# Patient Record
Sex: MALE | Race: WHITE | URBAN - NONMETROPOLITAN AREA
[De-identification: names, ages, dates, MRNs, and addresses within clinical notes are randomized per-mention and may not be internally consistent; named-entity substitution may affect disease eponyms.]

---

## 2021-05-10 ENCOUNTER — OFFICE VISIT (OUTPATIENT)
Dept: URBAN - NONMETROPOLITAN AREA CLINIC 13 | Facility: CLINIC | Age: 71
End: 2021-05-10

## 2021-05-24 ENCOUNTER — OFFICE VISIT (OUTPATIENT)
Dept: URBAN - NONMETROPOLITAN AREA CLINIC 13 | Facility: CLINIC | Age: 71
End: 2021-05-24
Payer: MEDICARE

## 2021-05-24 ENCOUNTER — WEB ENCOUNTER (OUTPATIENT)
Dept: URBAN - NONMETROPOLITAN AREA CLINIC 13 | Facility: CLINIC | Age: 71
End: 2021-05-24

## 2021-05-24 DIAGNOSIS — B37.0 THRUSH: ICD-10-CM

## 2021-05-24 DIAGNOSIS — R12 HEARTBURN: ICD-10-CM

## 2021-05-24 DIAGNOSIS — Z98.890 HISTORY OF NISSEN FUNDOPLICATION: ICD-10-CM

## 2021-05-24 DIAGNOSIS — J02.9 SORE THROAT: ICD-10-CM

## 2021-05-24 PROCEDURE — 99204 OFFICE O/P NEW MOD 45 MIN: CPT | Performed by: INTERNAL MEDICINE

## 2021-05-24 NOTE — HPI-TODAY'S VISIT:
Mr. Reginald Buenrostro is a pleasant 71 y/o M referred to GI clinic by Dr. Casey Cunha for heartburn and indigestion.  He visited ENT clinic with a sore throat and was found to have thrush.  He was treated with 2 weeks of diflucan and nystatin and developed bad heartburn when he was in bed.  He took omeprazole x 2 weeks and this resolved.  He is currently feeling well now.  No dysphagia or heartburn.  He saw a previous GI in [place] and gets colonoscopies every 5 years.  He is due next year (last 2017).  He does have a history of Nissen / hiatal hernia repair in 2005.

## 2021-05-25 PROBLEM — 123950001: Status: ACTIVE | Noted: 2021-05-25

## 2021-06-22 ENCOUNTER — CLAIMS CREATED FROM THE CLAIM WINDOW (OUTPATIENT)
Dept: URBAN - METROPOLITAN AREA CLINIC 4 | Facility: CLINIC | Age: 71
End: 2021-06-22
Payer: MEDICARE

## 2021-06-22 ENCOUNTER — OFFICE VISIT (OUTPATIENT)
Dept: URBAN - NONMETROPOLITAN AREA SURGERY CENTER 1 | Facility: SURGERY CENTER | Age: 71
End: 2021-06-22
Payer: MEDICARE

## 2021-06-22 DIAGNOSIS — K21.9 ACID REFLUX: ICD-10-CM

## 2021-06-22 DIAGNOSIS — K21.9 GASTRO-ESOPHAGEAL REFLUX DISEASE WITHOUT ESOPHAGITIS: ICD-10-CM

## 2021-06-22 PROCEDURE — 88305 TISSUE EXAM BY PATHOLOGIST: CPT | Performed by: PATHOLOGY

## 2021-06-22 PROCEDURE — G8907 PT DOC NO EVENTS ON DISCHARG: HCPCS | Performed by: INTERNAL MEDICINE

## 2021-06-22 PROCEDURE — 43239 EGD BIOPSY SINGLE/MULTIPLE: CPT | Performed by: INTERNAL MEDICINE

## 2021-07-26 ENCOUNTER — OFFICE VISIT (OUTPATIENT)
Dept: URBAN - NONMETROPOLITAN AREA CLINIC 13 | Facility: CLINIC | Age: 71
End: 2021-07-26
Payer: MEDICARE

## 2021-07-26 ENCOUNTER — DASHBOARD ENCOUNTERS (OUTPATIENT)
Age: 71
End: 2021-07-26

## 2021-07-26 DIAGNOSIS — R19.5 LOOSE STOOLS: ICD-10-CM

## 2021-07-26 DIAGNOSIS — Z80.0 FAMILY HISTORY OF COLON CANCER: ICD-10-CM

## 2021-07-26 DIAGNOSIS — R12 HEARTBURN: ICD-10-CM

## 2021-07-26 PROCEDURE — 99213 OFFICE O/P EST LOW 20 MIN: CPT | Performed by: NURSE PRACTITIONER

## 2021-07-26 RX ORDER — NYSTATIN 100000 [USP'U]/ML
SWISH 4ML IN MOUTH AND SPIT 3 TIMES DAILY FOR 7 DAYS FOR MOUTH THROAT SUSPENSION ORAL
Qty: 84 | Refills: 0 | Status: ACTIVE | COMMUNITY

## 2021-07-26 RX ORDER — METRONIDAZOLE 500 MG/1
1 TABLET TABLET, FILM COATED ORAL THREE TIMES A DAY
Qty: 42 TABLET | Refills: 0 | OUTPATIENT
Start: 2021-07-26 | End: 2021-08-09

## 2021-07-26 RX ORDER — FLUTICASONE PROPIONATE 50 UG/1
SPRAY, METERED NASAL
Qty: 16 | Status: ACTIVE | COMMUNITY

## 2021-07-26 RX ORDER — TESTOSTERONE 30 MG/1.5ML
APPLY 1 PUMP TOPICALLY EVERY MORNING SOLUTION TOPICAL
Qty: 90 | Refills: 0 | Status: ACTIVE | COMMUNITY

## 2021-07-26 RX ORDER — OMEPRAZOLE 20 MG/1
TAKE 1 CAPSULE BY MOUTH TWICE DAILY CAPSULE, DELAYED RELEASE ORAL
Qty: 60 | Refills: 0 | Status: ACTIVE | COMMUNITY

## 2021-07-26 RX ORDER — LORAZEPAM 1 MG/1
TABLET ORAL
Qty: 30 | Status: ACTIVE | COMMUNITY

## 2021-07-26 RX ORDER — DESMOPRESSIN ACETATE 0.2 MG/1
TAKE 2 TABLETS BY MOUTH ONCE DAILY AT BEDTIME TABLET ORAL
Qty: 180 | Refills: 0 | Status: ACTIVE | COMMUNITY

## 2021-07-26 RX ORDER — GABAPENTIN 100 MG/1
CAPSULE ORAL
Qty: 180 | Status: ACTIVE | COMMUNITY

## 2021-07-26 RX ORDER — LEVOTHYROXINE SODIUM 0.09 MG/1
TAKE 1 TABLET BY MOUTH ONCE A DAY 5 DAYS A WEEK TABLET ORAL
Qty: 82 | Refills: 0 | Status: ACTIVE | COMMUNITY

## 2021-07-26 NOTE — HPI-TODAY'S VISIT:
Mr. Reginald Buenrostro is a pleasant 69 y/o M referred to GI clinic by Dr. Casey Cunha for heartburn and indigestion.  He visited ENT clinic with a sore throat and was found to have thrush.  He was treated with 2 weeks of diflucan and nystatin and developed bad heartburn when he was in bed.  He took omeprazole x 2 weeks and this resolved.  He is currently feeling well now.  No dysphagia or heartburn.  He saw a previous GI in [place] and gets colonoscopies every 5 years.  He is due next year (last 2017).  He does have a history of Nissen / hiatal hernia repair in 2005.    Today, he is doing well with no CP or GI complaint. He is off ppi, but may use it prn. he does c/o loose stool 4x daily postpranial with no nocturnal complaints for the last 1.5 years. no blood.   6/22/21 EGD with reflux on bx

## 2021-07-27 ENCOUNTER — TELEPHONE ENCOUNTER (OUTPATIENT)
Dept: URBAN - NONMETROPOLITAN AREA CLINIC 2 | Facility: CLINIC | Age: 71
End: 2021-07-27

## 2021-07-27 RX ORDER — METRONIDAZOLE 250 MG/1
1 TABLET TABLET, FILM COATED ORAL
Qty: 56 TABLET | OUTPATIENT
Start: 2021-07-28 | End: 2021-08-11

## 2022-01-03 ENCOUNTER — OFFICE VISIT (OUTPATIENT)
Dept: URBAN - NONMETROPOLITAN AREA CLINIC 13 | Facility: CLINIC | Age: 72
End: 2022-01-03

## 2022-01-04 ENCOUNTER — OFFICE VISIT (OUTPATIENT)
Dept: URBAN - METROPOLITAN AREA TELEHEALTH 2 | Facility: TELEHEALTH | Age: 72
End: 2022-01-04

## 2022-01-04 RX ORDER — FLUTICASONE PROPIONATE 50 UG/1
SPRAY, METERED NASAL
Qty: 16 | Status: ACTIVE | COMMUNITY

## 2022-01-04 RX ORDER — LEVOTHYROXINE SODIUM 0.09 MG/1
TAKE 1 TABLET BY MOUTH ONCE A DAY 5 DAYS A WEEK TABLET ORAL
Qty: 82 | Refills: 0 | Status: ACTIVE | COMMUNITY

## 2022-01-04 RX ORDER — OMEPRAZOLE 20 MG/1
TAKE 1 CAPSULE BY MOUTH TWICE DAILY CAPSULE, DELAYED RELEASE ORAL
Qty: 60 | Refills: 0 | Status: ACTIVE | COMMUNITY

## 2022-01-04 RX ORDER — LORAZEPAM 1 MG/1
TABLET ORAL
Qty: 30 | Status: ACTIVE | COMMUNITY

## 2022-01-04 RX ORDER — GABAPENTIN 100 MG/1
CAPSULE ORAL
Qty: 180 | Status: ACTIVE | COMMUNITY

## 2022-01-04 RX ORDER — NYSTATIN 100000 [USP'U]/ML
SWISH 4ML IN MOUTH AND SPIT 3 TIMES DAILY FOR 7 DAYS FOR MOUTH THROAT SUSPENSION ORAL
Qty: 84 | Refills: 0 | Status: ACTIVE | COMMUNITY

## 2022-01-04 RX ORDER — DESMOPRESSIN ACETATE 0.2 MG/1
TAKE 2 TABLETS BY MOUTH ONCE DAILY AT BEDTIME TABLET ORAL
Qty: 180 | Refills: 0 | Status: ACTIVE | COMMUNITY

## 2022-01-04 RX ORDER — TESTOSTERONE 30 MG/1.5ML
APPLY 1 PUMP TOPICALLY EVERY MORNING SOLUTION TOPICAL
Qty: 90 | Refills: 0 | Status: ACTIVE | COMMUNITY

## 2022-01-04 NOTE — HPI-TODAY'S VISIT:
Mr. Reginald Buenrostro is a pleasant 69 y/o M referred to GI clinic by Dr. Casey Cunha for heartburn and indigestion.  He visited ENT clinic with a sore throat and was found to have thrush.  He was treated with 2 weeks of diflucan and nystatin and developed bad heartburn when he was in bed.  He took omeprazole x 2 weeks and this resolved.  He is currently feeling well now.  No dysphagia or heartburn.  He saw a previous GI in [place] and gets colonoscopies every 5 years.  He is due next year (last 2017).  He does have a history of Nissen / hiatal hernia repair in 2005.    Today, he is doing well with no CP or GI complaint. He is off ppi, but may use it prn. he does c/o loose stool 4x daily postpranial with no nocturnal complaints for the last 1.5 years. no blood.   6/22/21 EGD with reflux on bx

## 2023-05-30 ENCOUNTER — OFFICE VISIT (OUTPATIENT)
Dept: URBAN - NONMETROPOLITAN AREA CLINIC 13 | Facility: CLINIC | Age: 73
End: 2023-05-30